# Patient Record
Sex: FEMALE | Race: WHITE | NOT HISPANIC OR LATINO | URBAN - METROPOLITAN AREA
[De-identification: names, ages, dates, MRNs, and addresses within clinical notes are randomized per-mention and may not be internally consistent; named-entity substitution may affect disease eponyms.]

---

## 2024-04-16 ENCOUNTER — OFFICE VISIT (OUTPATIENT)
Dept: URGENT CARE | Facility: CLINIC | Age: 24
End: 2024-04-16
Payer: COMMERCIAL

## 2024-04-16 VITALS
SYSTOLIC BLOOD PRESSURE: 126 MMHG | DIASTOLIC BLOOD PRESSURE: 80 MMHG | OXYGEN SATURATION: 99 % | TEMPERATURE: 98.7 F | RESPIRATION RATE: 20 BRPM | HEART RATE: 74 BPM

## 2024-04-16 DIAGNOSIS — J01.90 ACUTE SINUSITIS, RECURRENCE NOT SPECIFIED, UNSPECIFIED LOCATION: Primary | ICD-10-CM

## 2024-04-16 DIAGNOSIS — J02.9 SORE THROAT: ICD-10-CM

## 2024-04-16 LAB — POC RAPID STREP: NEGATIVE

## 2024-04-16 PROCEDURE — 87880 STREP A ASSAY W/OPTIC: CPT | Performed by: PHYSICIAN ASSISTANT

## 2024-04-16 PROCEDURE — 99203 OFFICE O/P NEW LOW 30 MIN: CPT | Performed by: PHYSICIAN ASSISTANT

## 2024-04-16 PROCEDURE — 1036F TOBACCO NON-USER: CPT | Performed by: PHYSICIAN ASSISTANT

## 2024-04-16 RX ORDER — AMOXICILLIN 500 MG/1
500 CAPSULE ORAL 3 TIMES DAILY
Qty: 30 CAPSULE | Refills: 0 | Status: SHIPPED | OUTPATIENT
Start: 2024-04-16 | End: 2024-04-26

## 2024-04-16 ASSESSMENT — ENCOUNTER SYMPTOMS
SORE THROAT: 1
SINUS PAIN: 1
SINUS PRESSURE: 1

## 2024-04-16 ASSESSMENT — PAIN SCALES - GENERAL: PAINLEVEL: 6

## 2024-04-16 NOTE — PROGRESS NOTES
Subjective   Patient ID: Kimmie Santiago is a 23 y.o. female.    Patient is a 23-year-old female who complains of worsening congestion, sinus pressure, right ear pain and sore throat that she has been experiencing for the past approximately 3 weeks.  Patient states that she returned to [place] via airline last evening after a vacation trip to [place], [place].  Patient denies fever, chills or myalgia.  Patient states that she has not developed any degree of cough.  Patient reports that she has a history of recurrent sinus infections and that her current symptoms are consistent with same.      Earache   Associated symptoms include a sore throat.   The following portions of the chart were reviewed this encounter and updated as appropriate:       Review of Systems   HENT:  Positive for congestion, ear pain, sinus pressure, sinus pain and sore throat.    All other systems reviewed and are negative.  Objective   Physical Exam  Vitals and nursing note reviewed.   Constitutional:       Appearance: Normal appearance. She is normal weight.   HENT:      Head: Normocephalic and atraumatic.      Right Ear: Tympanic membrane, ear canal and external ear normal.      Left Ear: Tympanic membrane, ear canal and external ear normal.      Nose: Nose normal. No congestion or rhinorrhea.      Mouth/Throat:      Mouth: Mucous membranes are moist.      Pharynx: Oropharynx is clear. No oropharyngeal exudate or posterior oropharyngeal erythema.   Eyes:      Extraocular Movements: Extraocular movements intact.      Conjunctiva/sclera: Conjunctivae normal.      Pupils: Pupils are equal, round, and reactive to light.   Cardiovascular:      Rate and Rhythm: Normal rate and regular rhythm.      Pulses: Normal pulses.      Heart sounds: Normal heart sounds.   Pulmonary:      Effort: Pulmonary effort is normal. No respiratory distress.      Breath sounds: Normal breath sounds. No stridor. No wheezing, rhonchi or rales.    Musculoskeletal:      Cervical back: Normal range of motion and neck supple.   Skin:     General: Skin is warm and dry.      Capillary Refill: Capillary refill takes less than 2 seconds.   Neurological:      General: No focal deficit present.      Mental Status: She is alert and oriented to person, place, and time.   Psychiatric:         Mood and Affect: Mood normal.         Behavior: Behavior normal.         Thought Content: Thought content normal.         Judgment: Judgment normal.     Assessment/Plan   Physical exam findings as noted above.  Rapid strep test is negative.  Patient was provided with a prescription for amoxicillin 500 mg and supportive care instructions were discussed.  Patient verbalizes excellent understanding of same.    CLINICAL IMPRESSION:  Acute Sinusitis    Diagnoses and all orders for this visit:  Acute sinusitis, recurrence not specified, unspecified location  -     amoxicillin (Amoxil) 500 mg capsule; Take 1 capsule (500 mg) by mouth 3 times a day for 10 days.  Sore throat  -     POCT rapid strep A manually resulted    Patient disposition: Home

## 2025-02-21 ENCOUNTER — APPOINTMENT (OUTPATIENT)
Dept: OBSTETRICS AND GYNECOLOGY | Facility: CLINIC | Age: 25
End: 2025-02-21
Payer: COMMERCIAL

## 2025-02-21 VITALS
WEIGHT: 129 LBS | SYSTOLIC BLOOD PRESSURE: 135 MMHG | BODY MASS INDEX: 21.49 KG/M2 | HEIGHT: 65 IN | DIASTOLIC BLOOD PRESSURE: 88 MMHG

## 2025-02-21 DIAGNOSIS — Z30.433 ENCOUNTER FOR IUD REMOVAL AND REINSERTION: ICD-10-CM

## 2025-02-21 LAB — PREGNANCY TEST URINE, POC: NEGATIVE

## 2025-02-21 RX ORDER — BUPIVACAINE HYDROCHLORIDE 2.5 MG/ML
10 INJECTION, SOLUTION EPIDURAL; INFILTRATION; INTRACAUDAL ONCE
Status: COMPLETED | OUTPATIENT
Start: 2025-02-21 | End: 2025-02-21

## 2025-02-21 RX ORDER — LEVONORGESTREL 19.5 MG/1
INTRAUTERINE DEVICE INTRAUTERINE ONCE
COMMUNITY

## 2025-02-21 RX ADMIN — BUPIVACAINE HYDROCHLORIDE 25 MG: 2.5 INJECTION, SOLUTION EPIDURAL; INFILTRATION; INTRACAUDAL at 13:14

## 2025-02-21 NOTE — PROGRESS NOTES
Patient here for Kyleena removal and reinsertion.   IUD Insertion    Performed by: Bella NOVAK MD  Authorized by: Bella NOVAK MD    Procedure: IUD removal and insertion    Consent obtained by patient, parent, or legal power of  - including discussion of procedure risks and benefits, patient questions answered, and patient education provided: yes    Reason for removal: patient request    Strings visualized: yes    Cervix cleaned with: iodopovidone    Tenaculum applied to cervix: yes    IUD grasped by forceps: yes    Performed with ultrasound guidance: no    IUD removed: yes    Date/Time of Removal:  2/21/2025 1:10 PM  Removed without complications: yes    IUD intact: yes    Pregnancy risk: reasonably certain the patient is not pregnant    Date/Time of Insertion:  2/21/2025 1:10 PM  Immediately prior to procedure a time out was called: yes    Pelvic exam performed: no    Speculum placed in vagina: yes    Cervix cleaned and prepped: yes    Tenaculum/Allis/Ring Forceps applied to cervix: yes    Anesthesia used: yes    Local anesthesia:  Paracervical  Other local anesthetic:  Marcaine  Anesthetic strength (%):  0.25  Volume (mL):  10  Uterus sound depth (cm):  8  Cervix manually dilated: no    OSM: levonorgestreL 17.5 mcg/24 hr (5 yrs) 19.5 mg  Strings trimmed to (cm):  3  Patient tolerated procedure well: yes    Inserted with ultrasound guidance: no    Transvaginal sono confirmed fundal placement: no    Estimated blood loss (mL):  5  Intended removal date: 5 years

## 2025-03-21 ENCOUNTER — APPOINTMENT (OUTPATIENT)
Dept: OBSTETRICS AND GYNECOLOGY | Facility: CLINIC | Age: 25
End: 2025-03-21
Payer: COMMERCIAL

## 2025-04-21 ENCOUNTER — APPOINTMENT (OUTPATIENT)
Dept: OBSTETRICS AND GYNECOLOGY | Facility: CLINIC | Age: 25
End: 2025-04-21
Payer: COMMERCIAL

## 2025-04-21 VITALS — BODY MASS INDEX: 22.96 KG/M2 | WEIGHT: 138 LBS | SYSTOLIC BLOOD PRESSURE: 128 MMHG | DIASTOLIC BLOOD PRESSURE: 81 MMHG

## 2025-04-21 DIAGNOSIS — Z11.3 ROUTINE SCREENING FOR STI (SEXUALLY TRANSMITTED INFECTION): Primary | ICD-10-CM

## 2025-04-21 DIAGNOSIS — Z01.419 WELL WOMAN EXAM: ICD-10-CM

## 2025-04-21 PROCEDURE — 1036F TOBACCO NON-USER: CPT | Performed by: OBSTETRICS & GYNECOLOGY

## 2025-04-21 PROCEDURE — 99395 PREV VISIT EST AGE 18-39: CPT | Performed by: OBSTETRICS & GYNECOLOGY

## 2025-04-21 PROCEDURE — 87626 HPV SEP HI-RSK TYP&POOL RSLT: CPT

## 2025-04-21 ASSESSMENT — LIFESTYLE VARIABLES
HOW OFTEN DO YOU HAVE A DRINK CONTAINING ALCOHOL: MONTHLY OR LESS
SKIP TO QUESTIONS 9-10: 1
HOW MANY STANDARD DRINKS CONTAINING ALCOHOL DO YOU HAVE ON A TYPICAL DAY: 1 OR 2
HOW OFTEN DO YOU HAVE SIX OR MORE DRINKS ON ONE OCCASION: NEVER
AUDIT-C TOTAL SCORE: 1

## 2025-04-21 ASSESSMENT — SOCIAL DETERMINANTS OF HEALTH (SDOH)
WITHIN THE LAST YEAR, HAVE YOU BEEN HUMILIATED OR EMOTIONALLY ABUSED IN OTHER WAYS BY YOUR PARTNER OR EX-PARTNER?: NO
WITHIN THE LAST YEAR, HAVE YOU BEEN KICKED, HIT, SLAPPED, OR OTHERWISE PHYSICALLY HURT BY YOUR PARTNER OR EX-PARTNER?: NO
WITHIN THE LAST YEAR, HAVE YOU BEEN AFRAID OF YOUR PARTNER OR EX-PARTNER?: NO
WITHIN THE LAST YEAR, HAVE TO BEEN RAPED OR FORCED TO HAVE ANY KIND OF SEXUAL ACTIVITY BY YOUR PARTNER OR EX-PARTNER?: NO

## 2025-04-21 ASSESSMENT — PATIENT HEALTH QUESTIONNAIRE - PHQ9
SUM OF ALL RESPONSES TO PHQ9 QUESTIONS 1 & 2: 0
2. FEELING DOWN, DEPRESSED OR HOPELESS: NOT AT ALL
1. LITTLE INTEREST OR PLEASURE IN DOING THINGS: NOT AT ALL

## 2025-04-21 NOTE — PROGRESS NOTES
Last pap: 2021, ASCUS, HPV -  Last mamm: N/A  LMP: Absent   Contraception: IUD  Sexually active: Yes  Self breast exam: No    GYNECOLOGY PROGRESS NOTE        CC:    Chief Complaint   Patient presents with    Annual Exam         HPI:  Kimmie Santiago is here for a routine GYN examination.   Amenorrheic, Kyleena replaced .  Mom recently diagnosed with early breast cancer, genetics pending.  No urinary concerns.  Consents to STI testing      OB History    Para Term  AB Living   0 0 0 0 0 0   SAB IAB Ectopic Multiple Live Births   0 0 0 0 0       ROS:    GI - no hematochezia/constipation/diarrhea  URO - no hematuria/dysuria/urinary frequency  GYN - no vaginal discharge/dyspareunia/dysmenorrhea/pelvic pain  PSYCH - mood OK    PMH: Medical History[1]    PSH: Surgical History[2]    Soc:   Social History     Social History Narrative    Not on file     Occupation/education:works at [employer]  Activity/hobbies:     Fam Hx: Family History[3]      PHYSICAL EXAM:  /81   Wt 62.6 kg (138 lb)   BMI 22.96 kg/m²   GEN:  A&O, NAD  HEENT  head NC/AT, conjunctiva clear, no visible goiter  CV:  regular pulse rate and rhythm, no visible JVD  RESP:  symmetric respirations, no audible wheezing  ABD:  NT/ND, soft, no palpable masses  URO:  normal urethra, no bladder TTP  GYN:  normal vulva and perineum w/o lesions or ulcers, normal vagina without discharge or lesions, normal cervix without lesions or discharge or CMT iud strings at os, uterus NT/N anteverted , adnexa mobile and NT/NE  BREAST:  no masses or TTP, no skin lesions or nipple discharge, bilateral nipple piercings  DERM:  no hirsutism or acne   PSYCH:  normal affect, non-anxious        IMPRESSION/PLAN:  Problem List Items Addressed This Visit    None  Visit Diagnoses         Well woman exam              PAP performed, discussed breast self exam.  Pt agrees to STI screening      Bella Baeza MD       [1]   Past Medical  History:  Diagnosis Date    Nasal congestion 04/20/2015    Nasal congestion    Personal history of other diseases of the respiratory system 05/10/2013    History of upper respiratory infection    Personal history of other diseases of the respiratory system 11/24/2014    History of acute sinusitis    Personal history of other diseases of the respiratory system 04/20/2015    History of pharyngitis    Personal history of other infectious and parasitic diseases 05/10/2013    History of viral infection    Personal history of traumatic brain injury 09/09/2015    History of concussion    Primary oligomenorrhea 08/31/2015    Primary oligomenorrhea   [2]   Past Surgical History:  Procedure Laterality Date    OTHER SURGICAL HISTORY  11/16/2018    Tonsillectomy with adenoidectomy    OTHER SURGICAL HISTORY  11/16/2018    [place] tooth extraction   [3]   Family History  Problem Relation Name Age of Onset    Breast cancer Mother

## 2025-05-09 LAB
CYTOLOGY CMNT CVX/VAG CYTO-IMP: NORMAL
HPV HR GENOTYPES PNL CVX NAA+PROBE: POSITIVE
LAB AP HPV GENOTYPE QUESTION: NO
LAB AP HPV HR: NORMAL
LABORATORY COMMENT REPORT: NORMAL
PATH REPORT.TOTAL CANCER: NORMAL

## 2025-05-30 ENCOUNTER — APPOINTMENT (OUTPATIENT)
Dept: OBSTETRICS AND GYNECOLOGY | Facility: CLINIC | Age: 25
End: 2025-05-30
Payer: COMMERCIAL

## 2025-05-30 VITALS — BODY MASS INDEX: 22.13 KG/M2 | WEIGHT: 133 LBS | DIASTOLIC BLOOD PRESSURE: 81 MMHG | SYSTOLIC BLOOD PRESSURE: 118 MMHG

## 2025-05-30 DIAGNOSIS — R87.810 ASCUS WITH POSITIVE HIGH RISK HPV CERVICAL: ICD-10-CM

## 2025-05-30 DIAGNOSIS — R87.610 ASCUS WITH POSITIVE HIGH RISK HPV CERVICAL: ICD-10-CM

## 2025-05-30 LAB — PREGNANCY TEST URINE, POC: NEGATIVE

## 2025-05-30 PROCEDURE — 81025 URINE PREGNANCY TEST: CPT | Performed by: OBSTETRICS & GYNECOLOGY

## 2025-05-30 PROCEDURE — 57454 BX/CURETT OF CERVIX W/SCOPE: CPT | Performed by: OBSTETRICS & GYNECOLOGY

## 2025-05-30 RX ORDER — BUPIVACAINE HYDROCHLORIDE 2.5 MG/ML
10 INJECTION, SOLUTION INFILTRATION; PERINEURAL ONCE
Status: COMPLETED | OUTPATIENT
Start: 2025-05-30 | End: 2025-05-30

## 2025-05-30 RX ADMIN — BUPIVACAINE HYDROCHLORIDE 25 MG: 2.5 INJECTION, SOLUTION INFILTRATION; PERINEURAL at 14:36

## 2025-05-30 NOTE — PROGRESS NOTES
Patient ID: Kimmie Santiago is a 24 y.o. female.    Colposcopy    Date/Time: 5/30/2025 2:36 PM    Performed by: Bella NOVAK MD  Authorized by: Bella NOVAK MD    Consent:     Patient questions answered: yes      Risks and benefits of the procedure and its alternatives discussed: yes      Consent obtained:  Written    Consent given by:  Patient  Indication:     Other indication(s): clinical abnormality    Pre-procedure:     Speculum was placed in the vagina: yes      Prep solution(s): acetic acid      Local anesthetic:  Marcaine 0.25%    Total amount used (mL):  2  Procedure:     Colposcopy with: cervical biopsy      Biopsy taken: yes      Colposcopy details:  12 o clock    Cervix visibility: fully visualized      SCJ visibility: fully visualized      Lesion visualized: fully visualized      Acetowhite lesion(s): cervix      Acetowhite lesion(s) description:  Moderately dense    Ferric subsulfate solution applied: yes      Tampon inserted: no    Post-procedure:     Patient tolerance of procedure:  Patient tolerated the procedure well with no immediate complications    Estimated blood loss (mL):  3    Instructions and paperwork completed: no      Educational handouts given: no    Comments:      Already had gardasil  Educated on nicotine/HPV

## 2025-06-13 LAB
LAB AP ASR DISCLAIMER: NORMAL
LABORATORY COMMENT REPORT: NORMAL
PATH REPORT.COMMENTS IMP SPEC: NORMAL
PATH REPORT.FINAL DX SPEC: NORMAL
PATH REPORT.GROSS SPEC: NORMAL
PATH REPORT.RELEVANT HX SPEC: NORMAL
PATH REPORT.TOTAL CANCER: NORMAL